# Patient Record
Sex: FEMALE | Race: WHITE | NOT HISPANIC OR LATINO | URBAN - METROPOLITAN AREA
[De-identification: names, ages, dates, MRNs, and addresses within clinical notes are randomized per-mention and may not be internally consistent; named-entity substitution may affect disease eponyms.]

---

## 2022-04-01 ENCOUNTER — OFFICE VISIT (OUTPATIENT)
Dept: URBAN - METROPOLITAN AREA SURGERY CENTER 19 | Facility: SURGERY CENTER | Age: 62
End: 2022-04-01

## 2022-04-29 ENCOUNTER — OFFICE VISIT (OUTPATIENT)
Dept: URBAN - METROPOLITAN AREA SURGERY CENTER 19 | Facility: SURGERY CENTER | Age: 62
End: 2022-04-29

## 2022-05-05 ENCOUNTER — TELEPHONE ENCOUNTER (OUTPATIENT)
Dept: URBAN - METROPOLITAN AREA CLINIC 6 | Facility: CLINIC | Age: 62
End: 2022-05-05

## 2022-05-06 ENCOUNTER — OFFICE VISIT (OUTPATIENT)
Dept: URBAN - METROPOLITAN AREA SURGERY CENTER 19 | Facility: SURGERY CENTER | Age: 62
End: 2022-05-06
Payer: OTHER GOVERNMENT

## 2022-05-06 DIAGNOSIS — Z86.010 ADENOMAS PERSONAL HISTORY OF COLONIC POLYPS: ICD-10-CM

## 2022-05-06 PROCEDURE — G8907 PT DOC NO EVENTS ON DISCHARG: HCPCS | Performed by: INTERNAL MEDICINE

## 2022-05-06 PROCEDURE — G0105 COLORECTAL SCRN; HI RISK IND: HCPCS | Performed by: INTERNAL MEDICINE

## 2023-01-27 ENCOUNTER — WEB ENCOUNTER (OUTPATIENT)
Dept: URBAN - METROPOLITAN AREA CLINIC 128 | Facility: CLINIC | Age: 63
End: 2023-01-27

## 2023-02-01 ENCOUNTER — LAB OUTSIDE AN ENCOUNTER (OUTPATIENT)
Age: 63
End: 2023-02-01

## 2023-02-01 ENCOUNTER — OFFICE VISIT (OUTPATIENT)
Dept: URBAN - METROPOLITAN AREA CLINIC 128 | Facility: CLINIC | Age: 63
End: 2023-02-01
Payer: OTHER GOVERNMENT

## 2023-02-01 ENCOUNTER — WEB ENCOUNTER (OUTPATIENT)
Dept: URBAN - METROPOLITAN AREA CLINIC 128 | Facility: CLINIC | Age: 63
End: 2023-02-01

## 2023-02-01 ENCOUNTER — DASHBOARD ENCOUNTERS (OUTPATIENT)
Age: 63
End: 2023-02-01

## 2023-02-01 ENCOUNTER — LAB OUTSIDE AN ENCOUNTER (OUTPATIENT)
Dept: URBAN - METROPOLITAN AREA CLINIC 128 | Facility: CLINIC | Age: 63
End: 2023-02-01

## 2023-02-01 VITALS
HEIGHT: 63 IN | DIASTOLIC BLOOD PRESSURE: 92 MMHG | WEIGHT: 156 LBS | TEMPERATURE: 97.3 F | SYSTOLIC BLOOD PRESSURE: 134 MMHG | BODY MASS INDEX: 27.64 KG/M2 | HEART RATE: 80 BPM

## 2023-02-01 DIAGNOSIS — K21.9 GERD: ICD-10-CM

## 2023-02-01 DIAGNOSIS — Z83.71 FAMILY HISTORY OF COLONIC POLYPS: ICD-10-CM

## 2023-02-01 DIAGNOSIS — Z86.010 PERSONAL HISTORY OF COLONIC POLYPS: ICD-10-CM

## 2023-02-01 PROBLEM — 235595009 GASTROESOPHAGEAL REFLUX DISEASE: Status: ACTIVE | Noted: 2023-02-01

## 2023-02-01 PROBLEM — 428283002: Status: ACTIVE | Noted: 2023-02-01

## 2023-02-01 PROCEDURE — 99204 OFFICE O/P NEW MOD 45 MIN: CPT | Performed by: PHYSICIAN ASSISTANT

## 2023-02-01 RX ORDER — ATORVASTATIN CALCIUM 10 MG/1
1 TABLET TABLET, FILM COATED ORAL ONCE A DAY
Status: ACTIVE | COMMUNITY

## 2023-02-01 RX ORDER — SUCRALFATE 1 G/10ML
10 ML ON AN EMPTY STOMACH SUSPENSION ORAL TWICE A DAY
Qty: 600 MILLILITER | Refills: 1 | OUTPATIENT
Start: 2023-02-01 | End: 2023-04-02

## 2023-02-01 RX ORDER — ESTRADIOL 1 MG/1
1 TABLET TABLET ORAL ONCE A DAY
Status: ACTIVE | COMMUNITY

## 2023-02-01 RX ORDER — FAMOTIDINE 40 MG/1
1 TABLET AT BEDTIME TABLET, FILM COATED ORAL ONCE A DAY
Status: ACTIVE | COMMUNITY

## 2023-02-01 RX ORDER — HYDROCHLOROTHIAZIDE 12.5 MG/1
1 CAPSULE IN THE MORNING CAPSULE, GELATIN COATED ORAL ONCE A DAY
Status: ACTIVE | COMMUNITY

## 2023-02-01 RX ORDER — LEVOTHYROXINE SODIUM 75 UG/1
1 TABLET IN THE MORNING ON AN EMPTY STOMACH TABLET ORAL ONCE A DAY
Status: ACTIVE | COMMUNITY

## 2023-02-01 RX ORDER — TRAZODONE HYDROCHLORIDE 100 MG/1
1 TABLET AT BEDTIME TABLET ORAL ONCE A DAY
Status: ACTIVE | COMMUNITY

## 2023-02-01 RX ORDER — PROGESTERONE 100 MG/1
AS DIRECTED CAPSULE ORAL
Status: ACTIVE | COMMUNITY

## 2023-02-01 NOTE — HPI-TODAY'S VISIT:
The patient elicits the following symptoms:  GERD  Duration of symptoms:  x 22 years She tried to wean off of prilosec that she had been on for 22 years and switched to pepcid 20mg bid but now she has refractory GERD symptoms Location of symptoms: [facility]Q Associated symptoms: nausea Current stress: yes Patient gabriel loss of weight, loss of appetite, dysphagia Any recent weight changes: none Any recent changes in diet: none Last EGD: more than 10 years ago Last colonoscopy: Spring 2022, due to repeat in 5 years. Patient denies a family history of colon cancer. Her mother had colon polyps.

## 2023-02-01 NOTE — PHYSICAL EXAM GASTROINTESTINAL
Abdomen , soft, nontender, nondistended , no guarding or rigidity , no masses palpable , normal bowel sounds, negative Hoyos's sign Liver and Spleen , no hepatosplenomegaly Rectal deferred

## 2023-02-07 LAB
A/G RATIO: 1.9
ABSOLUTE BASOPHILS: 58
ABSOLUTE EOSINOPHILS: 173
ABSOLUTE LYMPHOCYTES: 2844
ABSOLUTE MONOCYTES: 454
ABSOLUTE NEUTROPHILS: 3672
ALBUMIN: 4.3
ALKALINE PHOSPHATASE: 36
ALT (SGPT): 14
AST (SGOT): 17
BASOPHILS: 0.8
BILIRUBIN, TOTAL: 0.6
BUN/CREATININE RATIO: (no result)
BUN: 18
CALCIUM: 9.3
CARBON DIOXIDE, TOTAL: 26
CHLORIDE: 103
CREATININE: 0.77
EGFR: 88
EOSINOPHILS: 2.4
GLOBULIN, TOTAL: 2.3
GLUCOSE: 86
H PYLORI BREATH TEST: (no result)
H. PYLORI BREATH TEST: (no result)
HEMATOCRIT: 43.6
HEMOGLOBIN: 14.9
IMMUNOGLOBULIN A: 106
INTERPRETATION: (no result)
INTERPRETATION: (no result)
LIPASE: 32
LYMPHOCYTES: 39.5
MCH: 30.3
MCHC: 34.2
MCV: 88.6
MONOCYTES: 6.3
MPV: 9.2
NEUTROPHILS: 51
PLATELET COUNT: 262
POTASSIUM: 3.9
PROTEIN, TOTAL: 6.6
RDW: 12.6
RED BLOOD CELL COUNT: 4.92
SODIUM: 140
TISSUE TRANSGLUTAMINASE AB, IGA: <1
WHITE BLOOD CELL COUNT: 7.2

## 2023-02-08 LAB — Lab: (no result)

## 2023-02-09 ENCOUNTER — TELEPHONE ENCOUNTER (OUTPATIENT)
Dept: URBAN - METROPOLITAN AREA CLINIC 6 | Facility: CLINIC | Age: 63
End: 2023-02-09

## 2023-02-16 LAB
H PYLORI BREATH TEST: NOT DETECTED
H. PYLORI BREATH TEST: NOT DETECTED
INTERPRETATION: NOT DETECTED

## 2023-02-21 ENCOUNTER — TELEPHONE ENCOUNTER (OUTPATIENT)
Dept: URBAN - METROPOLITAN AREA CLINIC 128 | Facility: CLINIC | Age: 63
End: 2023-02-21

## 2023-02-23 ENCOUNTER — ERX REFILL RESPONSE (OUTPATIENT)
Dept: URBAN - METROPOLITAN AREA CLINIC 128 | Facility: CLINIC | Age: 63
End: 2023-02-23

## 2023-02-23 RX ORDER — SUCRALFATE ORAL 1 G/10ML
TAKE 10 ML BY MOUTH TWICE A DAY ON EMPTY STOMACH SUSPENSION ORAL
Qty: 600 MILLILITER | Refills: 1 | OUTPATIENT

## 2023-02-23 RX ORDER — SUCRALFATE 1 G/10ML
10 ML ON AN EMPTY STOMACH SUSPENSION ORAL TWICE A DAY
Qty: 600 MILLILITER | Refills: 1 | OUTPATIENT

## 2023-02-28 ENCOUNTER — ERX REFILL RESPONSE (OUTPATIENT)
Dept: URBAN - METROPOLITAN AREA CLINIC 128 | Facility: CLINIC | Age: 63
End: 2023-02-28

## 2023-02-28 RX ORDER — SUCRALFATE ORAL 1 G/10ML
TAKE 10 ML BY MOUTH TWICE A DAY ON EMPTY STOMACH SUSPENSION ORAL
Qty: 600 MILLILITER | Refills: 1 | OUTPATIENT

## 2023-02-28 RX ORDER — SUCRALFATE ORAL 1 G/10ML
TAKE 10 ML BY MOUTH TWICE A DAY ON EMPTY STOMACH SUSPENSION ORAL
Qty: 1800 MILLILITER | Refills: 1 | OUTPATIENT

## 2023-06-30 ENCOUNTER — TELEPHONE ENCOUNTER (OUTPATIENT)
Dept: URBAN - METROPOLITAN AREA CLINIC 6 | Facility: CLINIC | Age: 63
End: 2023-06-30